# Patient Record
Sex: MALE | Race: WHITE | NOT HISPANIC OR LATINO | Employment: OTHER | URBAN - METROPOLITAN AREA
[De-identification: names, ages, dates, MRNs, and addresses within clinical notes are randomized per-mention and may not be internally consistent; named-entity substitution may affect disease eponyms.]

---

## 2023-02-20 ENCOUNTER — TELEPHONE (OUTPATIENT)
Dept: NEUROLOGY | Facility: CLINIC | Age: 28
End: 2023-02-20
Payer: MEDICARE

## 2023-02-20 NOTE — TELEPHONE ENCOUNTER
----- Message from Gary Roca sent at 2/20/2023 12:35 PM CST -----  Type:  Sooner Apoointment Request    Caller is requesting a sooner appointment.  Caller declined first available appointment listed below.  Caller will not accept being placed on the waitlist and is requesting a message be sent to doctor.  Name of Caller:pt  When is the first available appointment?none  Symptoms:Multiple scoliosis  Would the patient rather a call back or a response via [facility]? Call  Best Call Back Number:[phone]  Additional Information: pt had an mri done in [place] and the results came back abnormal, the pt is moving to [place] and would like to be seen as soon as possible.please call the pt if more information is needed

## 2023-02-20 NOTE — TELEPHONE ENCOUNTER
----- Message from Gary Roca sent at 2/20/2023 12:35 PM CST -----  Type:  Sooner Apoointment Request    Caller is requesting a sooner appointment.  Caller declined first available appointment listed below.  Caller will not accept being placed on the waitlist and is requesting a message be sent to doctor.  Name of Caller:pt  When is the first available appointment?none  Symptoms:Multiple scoliosis  Would the patient rather a call back or a response via [facility]? Call  Best Call Back Number:[phone]  Additional Information: pt had an mri done in [place] and the results came back abnormal, the pt is moving to [place] and would like to be seen as soon as possible.please call the pt if more information is needed

## 2023-02-20 NOTE — TELEPHONE ENCOUNTER
Spoke with patient mother in regards to scheduling appt. I advised her of our current next available and she stated that was too far away. Gave her the number to [facility] since they are looking for an MS specialist. She did verbalized understanding and stated she would reach out to them.

## 2023-09-27 ENCOUNTER — TELEPHONE (OUTPATIENT)
Dept: NEUROLOGY | Facility: CLINIC | Age: 28
End: 2023-09-27
Payer: MEDICARE

## 2023-09-27 NOTE — TELEPHONE ENCOUNTER
MRIs and neuro notes in [facility]. Msg to Akanksha to schedule pt with first available MS provider and ask pt to bring CD or MRIs to appt

## 2023-09-27 NOTE — TELEPHONE ENCOUNTER
----- Message from Moriah Gilbert RN sent at 9/26/2023  4:55 PM CDT -----  Regarding: FW: est care  Contact: [phone] (direct to chioma)    ----- Message -----  From: Mike Hayes  Sent: 9/26/2023   4:41 PM CDT  To: #  Subject: est care                                         Type:  Sooner Appointment Request    Caller is requesting a sooner appointment.  Caller declined first available appointment listed below.  Caller will not accept being placed on the waitlist and is requesting a message be sent to doctor.    Name of Caller:  Chioma with [facility] in [place], [place]    When is the first available appointment?  Dept book    Symptoms:  pt had new lesions in feb and oct of 2023 and needs soonest appt    Best Call Back Number:  [phone] (direct to chioma)    Additional Information:  pt has diagnosis of MS in [place] and needs to be seen b/c pt currently living in [place] in [place]. Please call Chioma to discuss how to schedule and med information.    NOTE: Chioma is in [place] and is 1hr ahead.